# Patient Record
(demographics unavailable — no encounter records)

---

## 2024-10-10 NOTE — DISCUSSION/SUMMARY
[EKG obtained to assist in diagnosis and management of assessed problem(s)] : EKG obtained to assist in diagnosis and management of assessed problem(s) [FreeTextEntry1] : EKG performed today was unremarkable.  HTN: The impression is hypertension. Currently, the condition is controlled. There are no changes in medication management. Continue current medical therapy. Other planned treatments include an exercise regimen, weight loss, low sodium diet, and alcohol moderation.  Overweight: Discussed risks of being overweight with the patient. Counselled on weight loss with dietary modification and increased physical activity/exercise.  Lab requisition provided to check A1CG, CBC, CMP, Lipid Profile, TSH, and UA.  Instructed to follow up in 6 months.  Plan was discussed with the patient.   I, Dr. Soni, personally performed the evaluation and management services for this patient. That E&M includes reviewing prior history/tests, conducting the medically appropriate examination and evaluation, assessing all conditions, establishing a plan of care, ordering tests, and counselling and educating the patient. I spent a total of 30 minutes on today's encounter evaluating and treating the patient.

## 2024-10-10 NOTE — CARDIOLOGY SUMMARY
[de-identified] : 10/09/2024: NSR, normal axis, normal intervals, (-) ST-T wave changes. 04/10/2024: NSR, normal axis, normal intervals, (-) ST-T wave changes. =======================================================

## 2024-10-10 NOTE — CARDIOLOGY SUMMARY
[de-identified] : 10/09/2024: NSR, normal axis, normal intervals, (-) ST-T wave changes. 04/10/2024: NSR, normal axis, normal intervals, (-) ST-T wave changes. =======================================================

## 2024-10-10 NOTE — HISTORY OF PRESENT ILLNESS
[FreeTextEntry1] : TAMMY ORTIZ is a 30-year-old male, with a PMHx significant for HTN and anxiety disorder, who presents today for a follow-up visit for evaluation of HTN. BP is well controlled today. Denies any new complaints. Reports he is feeling well. Otherwise: (-) chest pain, (-) SOB.

## 2025-04-10 NOTE — DISCUSSION/SUMMARY
[EKG obtained to assist in diagnosis and management of assessed problem(s)] : EKG obtained to assist in diagnosis and management of assessed problem(s) [FreeTextEntry1] : EKG performed today was unremarkable.  HTN: Currently, the condition is controlled. There are no changes in medication management. Continue current medical therapy. Other planned treatments include an exercise regimen, weight loss, low sodium diet, and alcohol moderation.  Overweight: Discussed risks of being overweight with the patient. Counselled on weight loss with dietary modification and increased physical activity/exercise.  Lab requisition provided to check A1CG, CBC, CMP, Lipid Profile, TSH, and UA.  Instructed to follow up in 6 months.  Plan was discussed with the patient.   I, Dr. Soni, personally performed the evaluation and management services for this patient. That E&M includes reviewing prior history/tests, conducting the medically appropriate examination and evaluation, assessing all conditions, establishing a plan of care, ordering tests, and counselling and educating the patient. I spent a total of 30 minutes on today's encounter evaluating and treating the patient.

## 2025-04-10 NOTE — HISTORY OF PRESENT ILLNESS
[FreeTextEntry1] : TAMMY ORTIZ is a 31-year-old male, with a PMHx significant for HTN and anxiety disorder, who presents today for a follow-up visit. Denies any new complaints. Reports he is feeling well. Otherwise: (-) chest pain, (-) SOB.

## 2025-04-10 NOTE — CARDIOLOGY SUMMARY
[de-identified] : 04/09/2025: NSR, normal axis, normal intervals, (-) ST-T wave changes. 10/09/2024: NSR, normal axis, normal intervals, (-) ST-T wave changes. 04/10/2024: NSR, normal axis, normal intervals, (-) ST-T wave changes. =======================================================

## 2025-04-10 NOTE — CARDIOLOGY SUMMARY
[de-identified] : 04/09/2025: NSR, normal axis, normal intervals, (-) ST-T wave changes. 10/09/2024: NSR, normal axis, normal intervals, (-) ST-T wave changes. 04/10/2024: NSR, normal axis, normal intervals, (-) ST-T wave changes. =======================================================